# Patient Record
Sex: MALE | Race: AMERICAN INDIAN OR ALASKA NATIVE
[De-identification: names, ages, dates, MRNs, and addresses within clinical notes are randomized per-mention and may not be internally consistent; named-entity substitution may affect disease eponyms.]

---

## 2019-10-15 ENCOUNTER — HOSPITAL ENCOUNTER (INPATIENT)
Dept: HOSPITAL 5 - ED | Age: 54
LOS: 1 days | Discharge: HOME | DRG: 304 | End: 2019-10-16
Attending: INTERNAL MEDICINE | Admitting: INTERNAL MEDICINE
Payer: SELF-PAY

## 2019-10-15 DIAGNOSIS — I25.5: ICD-10-CM

## 2019-10-15 DIAGNOSIS — Z91.19: ICD-10-CM

## 2019-10-15 DIAGNOSIS — I11.0: ICD-10-CM

## 2019-10-15 DIAGNOSIS — F17.200: ICD-10-CM

## 2019-10-15 DIAGNOSIS — Z71.6: ICD-10-CM

## 2019-10-15 DIAGNOSIS — F17.210: ICD-10-CM

## 2019-10-15 DIAGNOSIS — I50.23: ICD-10-CM

## 2019-10-15 DIAGNOSIS — I16.1: Primary | ICD-10-CM

## 2019-10-15 DIAGNOSIS — I25.10: ICD-10-CM

## 2019-10-15 DIAGNOSIS — I69.344: ICD-10-CM

## 2019-10-15 DIAGNOSIS — E78.5: ICD-10-CM

## 2019-10-15 DIAGNOSIS — I25.2: ICD-10-CM

## 2019-10-15 LAB
APTT BLD: 25.2 SEC. (ref 24.2–36.6)
BASOPHILS # (AUTO): 0 K/MM3 (ref 0–0.1)
BASOPHILS NFR BLD AUTO: 0.3 % (ref 0–1.8)
BENZODIAZEPINES SCREEN,URINE: (no result)
BUN SERPL-MCNC: 13 MG/DL (ref 9–20)
BUN/CREAT SERPL: 12 %
CALCIUM SERPL-MCNC: 8.3 MG/DL (ref 8.4–10.2)
EOSINOPHIL # BLD AUTO: 0.2 K/MM3 (ref 0–0.4)
EOSINOPHIL NFR BLD AUTO: 1.2 % (ref 0–4.3)
HCT VFR BLD CALC: 42.3 % (ref 35.5–45.6)
HEMOLYSIS INDEX: 5
HGB BLD-MCNC: 13.9 GM/DL (ref 11.8–15.2)
INR PPP: 1.08 (ref 0.87–1.13)
LYMPHOCYTES # BLD AUTO: 3.8 K/MM3 (ref 1.2–5.4)
LYMPHOCYTES NFR BLD AUTO: 24.8 % (ref 13.4–35)
MCHC RBC AUTO-ENTMCNC: 33 % (ref 32–34)
MCV RBC AUTO: 87 FL (ref 84–94)
METHADONE SCREEN,URINE: (no result)
MONOCYTES # (AUTO): 1.1 K/MM3 (ref 0–0.8)
MONOCYTES % (AUTO): 7.3 % (ref 0–7.3)
OPIATE SCREEN,URINE: (no result)
PLATELET # BLD: 278 K/MM3 (ref 140–440)
RBC # BLD AUTO: 4.85 M/MM3 (ref 3.65–5.03)

## 2019-10-15 PROCEDURE — 82550 ASSAY OF CK (CPK): CPT

## 2019-10-15 PROCEDURE — 80307 DRUG TEST PRSMV CHEM ANLYZR: CPT

## 2019-10-15 PROCEDURE — 99406 BEHAV CHNG SMOKING 3-10 MIN: CPT

## 2019-10-15 PROCEDURE — 94644 CONT INHLJ TX 1ST HOUR: CPT

## 2019-10-15 PROCEDURE — 83880 ASSAY OF NATRIURETIC PEPTIDE: CPT

## 2019-10-15 PROCEDURE — 93005 ELECTROCARDIOGRAM TRACING: CPT

## 2019-10-15 PROCEDURE — 84484 ASSAY OF TROPONIN QUANT: CPT

## 2019-10-15 PROCEDURE — 93306 TTE W/DOPPLER COMPLETE: CPT

## 2019-10-15 PROCEDURE — 4A033R1 MEASUREMENT OF ARTERIAL SATURATION, PERIPHERAL, PERCUTANEOUS APPROACH: ICD-10-PCS | Performed by: INTERNAL MEDICINE

## 2019-10-15 PROCEDURE — 85025 COMPLETE CBC W/AUTO DIFF WBC: CPT

## 2019-10-15 PROCEDURE — 94640 AIRWAY INHALATION TREATMENT: CPT

## 2019-10-15 PROCEDURE — 71275 CT ANGIOGRAPHY CHEST: CPT

## 2019-10-15 PROCEDURE — 85379 FIBRIN DEGRADATION QUANT: CPT

## 2019-10-15 PROCEDURE — 80048 BASIC METABOLIC PNL TOTAL CA: CPT

## 2019-10-15 PROCEDURE — 85007 BL SMEAR W/DIFF WBC COUNT: CPT

## 2019-10-15 PROCEDURE — 82553 CREATINE MB FRACTION: CPT

## 2019-10-15 PROCEDURE — 82803 BLOOD GASES ANY COMBINATION: CPT

## 2019-10-15 PROCEDURE — 93010 ELECTROCARDIOGRAM REPORT: CPT

## 2019-10-15 PROCEDURE — 71045 X-RAY EXAM CHEST 1 VIEW: CPT

## 2019-10-15 PROCEDURE — 94760 N-INVAS EAR/PLS OXIMETRY 1: CPT

## 2019-10-15 PROCEDURE — 85610 PROTHROMBIN TIME: CPT

## 2019-10-15 PROCEDURE — 36415 COLL VENOUS BLD VENIPUNCTURE: CPT

## 2019-10-15 PROCEDURE — 5A09357 ASSISTANCE WITH RESPIRATORY VENTILATION, LESS THAN 24 CONSECUTIVE HOURS, CONTINUOUS POSITIVE AIRWAY PRESSURE: ICD-10-PCS | Performed by: INTERNAL MEDICINE

## 2019-10-15 PROCEDURE — 85730 THROMBOPLASTIN TIME PARTIAL: CPT

## 2019-10-15 RX ADMIN — ARFORMOTEROL TARTRATE SCH MCG: 15 SOLUTION RESPIRATORY (INHALATION) at 20:15

## 2019-10-15 RX ADMIN — METOPROLOL TARTRATE SCH MG: 25 TABLET, FILM COATED ORAL at 21:52

## 2019-10-15 RX ADMIN — BUDESONIDE SCH MG: 0.5 INHALANT RESPIRATORY (INHALATION) at 20:15

## 2019-10-15 RX ADMIN — FUROSEMIDE SCH MG: 10 INJECTION, SOLUTION INTRAVENOUS at 17:05

## 2019-10-15 RX ADMIN — FAMOTIDINE SCH MG: 10 INJECTION, SOLUTION INTRAVENOUS at 12:01

## 2019-10-15 RX ADMIN — LISINOPRIL SCH MG: 5 TABLET ORAL at 12:04

## 2019-10-15 RX ADMIN — Medication SCH ML: at 12:05

## 2019-10-15 RX ADMIN — METOPROLOL TARTRATE SCH MG: 25 TABLET, FILM COATED ORAL at 12:04

## 2019-10-15 RX ADMIN — SPIRONOLACTONE SCH MG: 25 TABLET ORAL at 12:02

## 2019-10-15 RX ADMIN — IPRATROPIUM BROMIDE AND ALBUTEROL SULFATE SCH: .5; 3 SOLUTION RESPIRATORY (INHALATION) at 14:02

## 2019-10-15 RX ADMIN — ENOXAPARIN SODIUM SCH MG: 100 INJECTION SUBCUTANEOUS at 12:01

## 2019-10-15 RX ADMIN — ASPIRIN SCH MG: 325 TABLET, COATED ORAL at 12:02

## 2019-10-15 RX ADMIN — Medication SCH ML: at 21:52

## 2019-10-15 RX ADMIN — FAMOTIDINE SCH MG: 10 INJECTION, SOLUTION INTRAVENOUS at 21:53

## 2019-10-15 RX ADMIN — IPRATROPIUM BROMIDE AND ALBUTEROL SULFATE SCH: .5; 3 SOLUTION RESPIRATORY (INHALATION) at 20:15

## 2019-10-15 NOTE — XRAY REPORT
CHEST 1 VIEW 



INDICATION / CLINICAL INFORMATION:

resp distress.



COMPARISON: 

None available.



FINDINGS:



SUPPORT DEVICES: None.



HEART / MEDIASTINUM: No significant abnormality. 



LUNGS / PLEURA: Interstitial opacity in both perihilar regions. No pneumothorax. 



ADDITIONAL FINDINGS: No significant additional findings.



IMPRESSION:

1. Interstitial disease thought represent pulmonary edema.



Signer Name: Juan F Ivey MD 

Signed: 10/15/2019 2:22 AM

 Workstation Name: [email or web address]

## 2019-10-15 NOTE — CAT SCAN REPORT
CT angio chest 



INDICATION / CLINICAL INFORMATION:

MAIN: SHORTNESS OF BREATH. 100 ML OMNIPAQUE 350.



TECHNIQUE:

Precontrast bolus timing images were obtained followed by postcontrast axial and reformatted images.

3-plane MIP reconstructions were performed at an independent workstation by the technologist.

 All CT scans at this location are performed using CT dose reduction for ALARA by means of automated 
exposure control. 



COMPARISON:

None available.



FINDINGS:

Enhancement of the pulmonary arteries is normal. No evidence of pulmonary embolism.



There is a mild bullous lung disease in the apices.

Slight prominence of interstitial markings diffusely.

Very small right pleural effusion is present.

Dependent atelectasis bilaterally in the lower lobes.



There are no significantly enlarged mediastinal lymph nodes.

No pericardial effusion.



Limited upper abdominal images are negative.



Degenerative changes are seen in the thoracic spine. No acute osseous abnormality.



IMPRESSION:

1. No evidence of pulmonary embolus.

2. Mild diffuse interstitial lung disease. 



Signer Name: Juan F Ivey MD 

Signed: 10/15/2019 4:39 AM

 Workstation Name: [email or web address]

## 2019-10-15 NOTE — CONSULTATION
History of Present Illness


Consult date: 10/15/19


Consult reason: congestive heart failure


History of present illness: 





This is a 54-year old male who suffered an MI in 2014 and underwent coronary 

stenting of the right coronary artery. In 2016, he returned to this hospital 

with acute coronary syndrome. A cardiac cath at this time revealed  of distal

circumflex,  of mid RCA within prior stent, ejection fraction 20%. 

Angioplasty of the  RCA was attempted but unsuccessful, recommend for medical

therapy and risk factor modification, including smoking cessation. Patient has 

been lost to outpatient cardiac follow up and he continues to smoke.





Patient presented with complaints of shortness of breath. Patient admits he ran 

out of medications several weeks ago. Patient denies chest pain, denies 

palpitations and there is no lower extremity edema. Blood pressure of 161/101 on

presentation. Chest x-ray suggestive of acute pulmonary edema. 





Past History


Past Medical History: acute MI, CAD, hypertension, hyperlipidemia, stroke


Past Surgical History: No surgical history, Other (stent placement)


Social history: smoking (1/2 ppd)





Medications and Allergies


                                    Allergies











Allergy/AdvReac Type Severity Reaction Status Date / Time


 


No Known Allergies Allergy   Verified 10/15/19 04:14











Active Meds: 


Active Medications





Acetaminophen (Tylenol)  650 mg PO Q4H PRN


   PRN Reason: Pain MILD(1-3)/Fever >100.5/HA


Albuterol/Ipratropium (Duoneb *Not For Prn Use*)  1 ampul [facility]H TIDRT [facility]


Arformoterol Tartrate (Brovana Nebu)  15 mcg IH Q12HRT [facility]


Aspirin (Ecotrin)  325 mg PO QDAY [facility]


Atorvastatin Calcium (Lipitor)  80 mg PO QHS [facility]


Budesonide (Pulmicort)  0.5 mg IH Q12HRT [facility]


Enoxaparin Sodium (Lovenox)  40 mg SUB-Q QDAY [facility]


Famotidine (Pepcid)  20 mg IV BID [facility]


Methylprednisolone Sodium Succinate (Solu-Medrol)  60 mg IV Q8HR [facility]


   Last Admin: 10/15/19 07:37 Dose:  60 mg


   Documented by: 


Metoprolol Tartrate (Lopressor)  25 mg PO BID [facility]


Ondansetron HCl (Zofran)  4 mg IV Q8H PRN


   PRN Reason: Nausea And Vomiting


Sodium Chloride (Sodium Chloride Flush Syringe 10 Ml)  10 ml IV BID JETT


Sodium Chloride (Sodium Chloride Flush Syringe 10 Ml)  10 ml IV PRN PRN


   PRN Reason: LINE FLUSH











Physical Examination


                                   Vital Signs











Temp Pulse Resp BP Pulse Ox


 


 97.8 F   115 H  32 H  161/101   100 


 


 10/15/19 02:00  10/15/19 02:00  10/15/19 02:00  10/15/19 02:00  10/15/19 02:00











General appearance: no acute distress


HEENT: Positive: PERRL


Neck: Positive: trachea midline


Cardiac: Positive: Reg Rate and Rhythm


Lungs: Positive: Decreased Breath Sounds


Neuro: Positive: Grossly Intact


Extremities: Absent: edema





Results





                                 10/15/19 02:10





                                 10/15/19 02:10


                                 Cardiac Enzymes











  10/15/19 Range/Units





  10:21 


 


CK-MB (CK-2)  7.7 H  (0.0-4.0)  ng/mL








                                   Coagulation











  10/15/19 Range/Units





  02:10 


 


PT  13.7  (12.2-14.9)  Sec.


 


INR  1.08  (0.87-1.13)  


 


APTT  25.2  (24.2-36.6)  Sec.








                                       CBC











  10/15/19 Range/Units





  02:10 


 


WBC  15.3 H  (4.5-11.0)  K/mm3


 


RBC  4.85  (3.65-5.03)  M/mm3


 


Hgb  13.9  (11.8-15.2)  gm/dl


 


Hct  42.3  (35.5-45.6)  %


 


Plt Count  278  (140-440)  K/mm3


 


Lymph #  3.8  (1.2-5.4)  K/mm3


 


Mono #  1.1 H  (0.0-0.8)  K/mm3


 


Eos #  0.2  (0.0-0.4)  K/mm3


 


Baso #  0.0  (0.0-0.1)  K/mm3








                          Comprehensive Metabolic Panel











  10/15/19 Range/Units





  02:10 


 


Sodium  140  (137-145)  mmol/L


 


Potassium  3.8  (3.6-5.0)  mmol/L


 


Chloride  104.1  ()  mmol/L


 


Carbon Dioxide  22  (22-30)  mmol/L


 


BUN  13  (9-20)  mg/dL


 


Creatinine  1.1  (0.8-1.5)  mg/dL


 


Glucose  230 H  ()  mg/dL


 


Calcium  8.3 L  (8.4-10.2)  mg/dL














Assessment and Plan





Acute pulmonary edema


History of MI/CAD


   [facility] 12/2016 findings:


    of distal circ


    of mid RCA within prior stent


   EF 20%


   Unsuccessful PCI of the RCA , recommended for medical therapy and risk 

factor modification.


Ischemic Cardiomyopathy


Hypertension


Nicotine dependence


Noncompliant with medication and outpatient cardiac follow up.





Recommend 


Advised smoking cessation.


Medical therapy for coronary disease and ischemic cardiomyopathy.


Echocardiogram for LVEF reassessment.

## 2019-10-15 NOTE — HISTORY AND PHYSICAL REPORT
<SAINT-ANGELA,HURLINE - Last Filed: 10/15/19 04:30>





History of Present Illness


Date of examination: 10/15/19


Date of admission: 


10/15/19


Chief complaint: 





SOB


History of present illness: 





Patient is a 53-year-old  with PMHx of CAD s/p stent x 2, CVA with left lower 

leg weakness, hypertension who was brought to the ER with by EMS for c/o diffi

culty breathing. According to the patient's SO in room, he woke up in the middle

of the night with severe respiratory distress. Pt states that he was unable to 

catch his breath. On arrival to the ER,  Patient was given albuterol treatment 

with no improvement in his symptoms, he was  also given Solu-Medrol,  0.3 of 

epinephrine IM with little improvement, he was later he was placed in a CPAP 

with good response.  Pt denies prior history of CHF, he reports smoking 1/2 ppd 

denies chest pain, he denies n/v, denies diaphoresis, denies palpitation, pt's 

BP was elevated, he was admitted for further evaluation and treatment.





Past History


Past Medical History: acute MI, CAD, hypertension, hyperlipidemia, stroke


Past Surgical History: No surgical history, Other (stent placement)


Social history: smoking (1/2 ppd)





Medications and Allergies


                                    Allergies











Allergy/AdvReac Type Severity Reaction Status Date / Time


 


No Known Allergies Allergy   Verified 10/15/19 04:14











Active Meds: 


Active Medications





Nitroglycerin/Dextrose (Tridil Drip 50mg/250ml)  50 mg in 250 mls @ 3 mls/hr IV 

TITR JETT; Protocol











Review of Systems


Respiratory: shortness of breath, dyspnea on exertion





Exam





- Constitutional


Vitals: 


                                        











Temp Pulse Resp BP Pulse Ox


 


 97.8 F   104 H  14   116/67   98 


 


 10/15/19 02:00  10/15/19 03:15  10/15/19 03:15  10/15/19 03:15  10/15/19 03:15











General appearance: Present: severe distress





- EENT


Eyes: Present: PERRL, EOM intact


ENT: hearing intact





- Neck


Neck: Present: normal ROM





- Respiratory


Respiratory effort: labored, accessory muscle use


Respiratory: bilateral: diminished





- Cardiovascular


Rhythm: irregularly irregular





- Extremities


Extremities: no ischemia, No edema


Peripheral Pulses: within normal limits





- Abdominal


General gastrointestinal: Present: non-tender, tender, non-distended





- Rectal


Rectal Exam: deferred





- Integumentary


Integumentary: Present: warm, dry





- Musculoskeletal


Musculoskeletal: strength equal bilaterally





- Psychiatric


Psychiatric: cooperative





- Neurologic


Neurologic: moves all extremities





Results





- Labs


CBC & Chem 7: 


                                 10/15/19 02:10





                                 10/15/19 02:10


Labs: 


                             Laboratory Last Values











WBC  15.3 K/mm3 (4.5-11.0)  H  10/15/19  02:10    


 


RBC  4.85 M/mm3 (3.65-5.03)   10/15/19  02:10    


 


Hgb  13.9 gm/dl (11.8-15.2)   10/15/19  02:10    


 


Hct  42.3 % (35.5-45.6)   10/15/19  02:10    


 


MCV  87 fl (84-94)   10/15/19  02:10    


 


MCH  29 pg (28-32)   10/15/19  02:10    


 


MCHC  33 % (32-34)   10/15/19  02:10    


 


RDW  15.2 % (13.2-15.2)   10/15/19  02:10    


 


Plt Count  278 K/mm3 (140-440)   10/15/19  02:10    


 


Lymph % (Auto)  24.8 % (13.4-35.0)   10/15/19  02:10    


 


Mono % (Auto)  7.3 % (0.0-7.3)   10/15/19  02:10    


 


Eos % (Auto)  1.2 % (0.0-4.3)   10/15/19  02:10    


 


Baso % (Auto)  0.3 % (0.0-1.8)   10/15/19  02:10    


 


Lymph #  3.8 K/mm3 (1.2-5.4)   10/15/19  02:10    


 


Mono #  1.1 K/mm3 (0.0-0.8)  H  10/15/19  02:10    


 


Eos #  0.2 K/mm3 (0.0-0.4)   10/15/19  02:10    


 


Baso #  0.0 K/mm3 (0.0-0.1)   10/15/19  02:10    


 


Seg Neutrophils %  66.4 % (40.0-70.0)   10/15/19  02:10    


 


Seg Neutrophils #  10.2 K/mm3 (1.8-7.7)  H  10/15/19  02:10    


 


PT  13.7 Sec. (12.2-14.9)   10/15/19  02:10    


 


INR  1.08  (0.87-1.13)   10/15/19  02:10    


 


APTT  25.2 Sec. (24.2-36.6)   10/15/19  02:10    


 


D-Dimer  340.09 ng/mlDDU (0-234)  H  10/15/19  02:10    


 


POC ABG pH  7.273  (7.35-7.45)  L  10/15/19  02:24    


 


POC ABG pCO2  53.2  (35-45)  H  10/15/19  02:24    


 


POC ABG pO2  202  ()  H  10/15/19  02:24    


 


POC ABG HCO3  24.6  (22-26 mml/L)   10/15/19  02:24    


 


POC ABG Total CO2  26  (23-27mmol/L)   10/15/19  02:24    


 


POC ABG O2 Sat  100   10/15/19  02:24    


 


POC ABG Base Excess  -2  ((-2) - (+3)mmol/L)   10/15/19  02:24    


 


FiO2  60 %  10/15/19  02:24    


 


Sodium  140 mmol/L (137-145)   10/15/19  02:10    


 


Potassium  3.8 mmol/L (3.6-5.0)   10/15/19  02:10    


 


Chloride  104.1 mmol/L ()   10/15/19  02:10    


 


Carbon Dioxide  22 mmol/L (22-30)   10/15/19  02:10    


 


Anion Gap  18 mmol/L  10/15/19  02:10    


 


BUN  13 mg/dL (9-20)   10/15/19  02:10    


 


Creatinine  1.1 mg/dL (0.8-1.5)   10/15/19  02:10    


 


Estimated GFR  > 60 ml/min  10/15/19  02:10    


 


BUN/Creatinine Ratio  12 %  10/15/19  02:10    


 


Glucose  230 mg/dL ()  H  10/15/19  02:10    


 


Calcium  8.3 mg/dL (8.4-10.2)  L  10/15/19  02:10    


 


Troponin T  < 0.010 ng/mL (0.00-0.029)   10/15/19  02:10    


 


NT-Pro-B Natriuret Pep  393.4 pg/mL (0-900)   10/15/19  02:10    














Assessment and Plan


Assessment and plan: 





1. New onset CHF


2. H/o CAD s/p stent x 2


3. Accelerated HTN


4. H/o Stroke with left sided weakness


5. Hyperglycemia (? new onset)


6. Tobacco used disorder





Plan:


Admitted to med/tele for CHF/Bipap management


Continue CE q6hr x 2 more


Cardiac diet


Consult cardiology for eval


Start ASA, Statin, ACE, Beta blocker


Possible echocardiogram in am if ok with cardiology


Resume home meds


Plan of care d/w pt voiced understanding


Advance Directives: Yes


VTE prophylaxis?: Chemical


Plan of care discussed with patient/family: Yes





<ROSA HANKINS - Last Filed: 10/15/19 06:31>





History of Present Illness


Date of admission: 


10/15/19 05:10








Medications and Allergies


Active Meds: 


Active Medications





Acetaminophen (Tylenol)  650 mg PO Q4H PRN


   PRN Reason: Pain MILD(1-3)/Fever >100.5/HA


Albuterol/Ipratropium (Duoneb *Not For Prn Use*)  1 ampul IH Q6HRT [facility]


Aspirin (Ecotrin)  325 mg PO QDAY JETT


Atorvastatin Calcium (Lipitor)  80 mg PO QHS JETT


Enoxaparin Sodium (Lovenox)  40 mg SUB-Q QDAY [facility]


Famotidine (Pepcid)  20 mg IV BID [facility]


Methylprednisolone Sodium Succinate (Solu-Medrol)  60 mg IV Q8HR [facility]


Metoprolol Tartrate (Lopressor)  25 mg PO BID [facility]


Ondansetron HCl (Zofran)  4 mg IV Q8H PRN


   PRN Reason: Nausea And Vomiting


Sodium Chloride (Sodium Chloride Flush Syringe 10 Ml)  10 ml IV BID JETT


Sodium Chloride (Sodium Chloride Flush Syringe 10 Ml)  10 ml IV PRN PRN


   PRN Reason: LINE FLUSH











Exam





- Constitutional


Vitals: 


                                        











Temp Pulse Resp BP Pulse Ox


 


 97.8 F   87   19   132/77   95 


 


 10/15/19 02:00  10/15/19 05:16  10/15/19 05:16  10/15/19 05:16  10/15/19 05:16














Results





- Labs


CBC & Chem 7: 


                                 10/15/19 02:10





                                 10/15/19 02:10


Labs: 


                             Laboratory Last Values











WBC  15.3 K/mm3 (4.5-11.0)  H  10/15/19  02:10    


 


RBC  4.85 M/mm3 (3.65-5.03)   10/15/19  02:10    


 


Hgb  13.9 gm/dl (11.8-15.2)   10/15/19  02:10    


 


Hct  42.3 % (35.5-45.6)   10/15/19  02:10    


 


MCV  87 fl (84-94)   10/15/19  02:10    


 


MCH  29 pg (28-32)   10/15/19  02:10    


 


MCHC  33 % (32-34)   10/15/19  02:10    


 


RDW  15.2 % (13.2-15.2)   10/15/19  02:10    


 


Plt Count  278 K/mm3 (140-440)   10/15/19  02:10    


 


Lymph % (Auto)  24.8 % (13.4-35.0)   10/15/19  02:10    


 


Mono % (Auto)  7.3 % (0.0-7.3)   10/15/19  02:10    


 


Eos % (Auto)  1.2 % (0.0-4.3)   10/15/19  02:10    


 


Baso % (Auto)  0.3 % (0.0-1.8)   10/15/19  02:10    


 


Lymph #  3.8 K/mm3 (1.2-5.4)   10/15/19  02:10    


 


Mono #  1.1 K/mm3 (0.0-0.8)  H  10/15/19  02:10    


 


Eos #  0.2 K/mm3 (0.0-0.4)   10/15/19  02:10    


 


Baso #  0.0 K/mm3 (0.0-0.1)   10/15/19  02:10    


 


Seg Neutrophils %  66.4 % (40.0-70.0)   10/15/19  02:10    


 


Seg Neutrophils #  10.2 K/mm3 (1.8-7.7)  H  10/15/19  02:10    


 


PT  13.7 Sec. (12.2-14.9)   10/15/19  02:10    


 


INR  1.08  (0.87-1.13)   10/15/19  02:10    


 


APTT  25.2 Sec. (24.2-36.6)   10/15/19  02:10    


 


D-Dimer  340.09 ng/mlDDU (0-234)  H  10/15/19  02:10    


 


POC ABG pH  7.273  (7.35-7.45)  L  10/15/19  02:24    


 


POC ABG pCO2  53.2  (35-45)  H  10/15/19  02:24    


 


POC ABG pO2  202  ()  H  10/15/19  02:24    


 


POC ABG HCO3  24.6  (22-26 mml/L)   10/15/19  02:24    


 


POC ABG Total CO2  26  (23-27mmol/L)   10/15/19  02:24    


 


POC ABG O2 Sat  100   10/15/19  02:24    


 


POC ABG Base Excess  -2  ((-2) - (+3)mmol/L)   10/15/19  02:24    


 


FiO2  60 %  10/15/19  02:24    


 


Sodium  140 mmol/L (137-145)   10/15/19  02:10    


 


Potassium  3.8 mmol/L (3.6-5.0)   10/15/19  02:10    


 


Chloride  104.1 mmol/L ()   10/15/19  02:10    


 


Carbon Dioxide  22 mmol/L (22-30)   10/15/19  02:10    


 


Anion Gap  18 mmol/L  10/15/19  02:10    


 


BUN  13 mg/dL (9-20)   10/15/19  02:10    


 


Creatinine  1.1 mg/dL (0.8-1.5)   10/15/19  02:10    


 


Estimated GFR  > 60 ml/min  10/15/19  02:10    


 


BUN/Creatinine Ratio  12 %  10/15/19  02:10    


 


Glucose  230 mg/dL ()  H  10/15/19  02:10    


 


Calcium  8.3 mg/dL (8.4-10.2)  L  10/15/19  02:10    


 


Troponin T  < 0.010 ng/mL (0.00-0.029)   10/15/19  02:10    


 


NT-Pro-B Natriuret Pep  393.4 pg/mL (0-900)   10/15/19  02:10    














Assessment and Plan


Assessment and plan: 


54-year-old woman with a history of hypertension, coronary artery disease, 

stroke as emergency room with complaints of shortness of breath that woke him 

from sleep.  He was in her*distress in the emergency room, started on BiPAP 

given IV Lasix.  He has been out of his antihypertensive 2 weeks.  No 

significant for vascular congestion, elevated d-dimer, ordered a CT chest, rule 

out PE.  Also start steroids, nebulizer treatment and for possible COPD.  Check 

cardiac enzymes, echo, consult cardiology.  Agree with Lasix

## 2019-10-15 NOTE — EMERGENCY DEPARTMENT REPORT
ED General Adult HPI





- General


Chief complaint: Dyspnea/Respdistress


Stated complaint: EVERT[place]


Time Seen by Provider: 10/15/19 02:01





- History of Present Illness


Initial comments: 





Patient is a 53-year-old  male with a past history of CVA with 

no residual deficit and hypertension who is presenting in acute respiratory 

distress.  Paramedics state that family called because he woke up from sleep and

could not breathe.  When they arrived on scene patient had an increased 

respiratory rate was tripoding and was not moving air very well.  Patient would 

not tolerate CPAP at the time.  Patient was given albuterol treatment which did 

seem to help the patient's symptoms slightly.  Is also given Solu-Medrol and 

magnesium.  Patient also was given 0.3 of epinephrine IM.  On arrival patient's 

O2 sat was in the hospice 60s and a blood pressure was 180 systolic.  Patient 

was denying chest pain but was unable to give any additional history.





ED Review of Systems


ROS: 


Stated complaint: EVERT[place]


Other details as noted in HPI





Comment: Unobtainable due to pts medical conditions





ED Physical Exam





- General


General appearance: alert, lethargic, in distress, other (patient diaphoretic)





- Head


Head exam: Present: atraumatic, normocephalic





- Eye


Eye exam: Present: normal appearance, PERRL, EOMI





- ENT


ENT exam: Present: mucous membranes moist





- Neck


Neck exam: Present: normal inspection





- Respiratory


Respiratory exam: Present: respiratory distress, decreased breath sounds





- Cardiovascular


Cardiovascular Exam: Present: tachycardia





- GI/Abdominal


GI/Abdominal exam: Present: soft.  Absent: distended, tenderness, guarding, r

ebound, rigid





- Neurological Exam


Neurological exam: Present: alert, oriented X3





- Psychiatric


Psychiatric exam: Present: normal affect, normal mood





- Skin


Skin exam: Present: warm, intact, normal color, diaphoretic.  Absent: rash





ED Course





                                   Vital Signs











  10/15/19





  02:00


 


Pulse Rate 115 H


 


Pulse Rate [ 115 H





Anterior 





Bilateral 





Throughout] 


 


Respiratory 44 H





Rate 


 


Respiratory 32 H





Rate [Anterior 





Bilateral 





Throughout] 


 


O2 Sat by Pulse 100





Oximetry 














ED Medical Decision Making





- Lab Data


Result diagrams: 


                                 10/15/19 02:10





                                 10/15/19 02:10








                                   Lab Results











  10/15/19 10/15/19 10/15/19 Range/Units





  02:10 02:10 02:10 


 


WBC  15.3 H    (4.5-11.0)  K/mm3


 


RBC  4.85    (3.65-5.03)  M/mm3


 


Hgb  13.9    (11.8-15.2)  gm/dl


 


Hct  42.3    (35.5-45.6)  %


 


MCV  87    (84-94)  fl


 


MCH  29    (28-32)  pg


 


MCHC  33    (32-34)  %


 


RDW  15.2    (13.2-15.2)  %


 


Plt Count  278    (140-440)  K/mm3


 


Lymph % (Auto)  24.8    (13.4-35.0)  %


 


Mono % (Auto)  7.3    (0.0-7.3)  %


 


Eos % (Auto)  1.2    (0.0-4.3)  %


 


Baso % (Auto)  0.3    (0.0-1.8)  %


 


Lymph #  3.8    (1.2-5.4)  K/mm3


 


Mono #  1.1 H    (0.0-0.8)  K/mm3


 


Eos #  0.2    (0.0-0.4)  K/mm3


 


Baso #  0.0    (0.0-0.1)  K/mm3


 


Seg Neutrophils %  66.4    (40.0-70.0)  %


 


Seg Neutrophils #  10.2 H    (1.8-7.7)  K/mm3


 


PT   13.7   (12.2-14.9)  Sec.


 


INR   1.08   (0.87-1.13)  


 


APTT   25.2   (24.2-36.6)  Sec.


 


D-Dimer   340.09 H   (0-234)  ng/mlDDU


 


POC ABG pH     (7.35-7.45)  


 


POC ABG pCO2     (35-45)  


 


POC ABG pO2     ()  


 


POC ABG HCO3     (22-26 mml/L)  


 


POC ABG Total CO2     (23-27mmol/L)  


 


POC ABG O2 Sat     


 


POC ABG Base Excess     ((-2) - (+3)mmol/L)  


 


FiO2     %


 


Sodium    140  (137-145)  mmol/L


 


Potassium    3.8  (3.6-5.0)  mmol/L


 


Chloride    104.1  ()  mmol/L


 


Carbon Dioxide    22  (22-30)  mmol/L


 


Anion Gap    18  mmol/L


 


BUN    13  (9-20)  mg/dL


 


Creatinine    1.1  (0.8-1.5)  mg/dL


 


Estimated GFR    > 60  ml/min


 


BUN/Creatinine Ratio    12  %


 


Glucose    230 H  ()  mg/dL


 


Calcium    8.3 L  (8.4-10.2)  mg/dL


 


Troponin T    < 0.010  (0.00-0.029)  ng/mL


 


NT-Pro-B Natriuret Pep    393.4  (0-900)  pg/mL














  10/15/19 Range/Units





  02:24 


 


WBC   (4.5-11.0)  K/mm3


 


RBC   (3.65-5.03)  M/mm3


 


Hgb   (11.8-15.2)  gm/dl


 


Hct   (35.5-45.6)  %


 


MCV   (84-94)  fl


 


MCH   (28-32)  pg


 


MCHC   (32-34)  %


 


RDW   (13.2-15.2)  %


 


Plt Count   (140-440)  K/mm3


 


Lymph % (Auto)   (13.4-35.0)  %


 


Mono % (Auto)   (0.0-7.3)  %


 


Eos % (Auto)   (0.0-4.3)  %


 


Baso % (Auto)   (0.0-1.8)  %


 


Lymph #   (1.2-5.4)  K/mm3


 


Mono #   (0.0-0.8)  K/mm3


 


Eos #   (0.0-0.4)  K/mm3


 


Baso #   (0.0-0.1)  K/mm3


 


Seg Neutrophils %   (40.0-70.0)  %


 


Seg Neutrophils #   (1.8-7.7)  K/mm3


 


PT   (12.2-14.9)  Sec.


 


INR   (0.87-1.13)  


 


APTT   (24.2-36.6)  Sec.


 


D-Dimer   (0-234)  ng/mlDDU


 


POC ABG pH  7.273 L  (7.35-7.45)  


 


POC ABG pCO2  53.2 H  (35-45)  


 


POC ABG pO2  202 H  ()  


 


POC ABG HCO3  24.6  (22-26 mml/L)  


 


POC ABG Total CO2  26  (23-27mmol/L)  


 


POC ABG O2 Sat  100  


 


POC ABG Base Excess  -2  ((-2) - (+3)mmol/L)  


 


FiO2  60  %


 


Sodium   (137-145)  mmol/L


 


Potassium   (3.6-5.0)  mmol/L


 


Chloride   ()  mmol/L


 


Carbon Dioxide   (22-30)  mmol/L


 


Anion Gap   mmol/L


 


BUN   (9-20)  mg/dL


 


Creatinine   (0.8-1.5)  mg/dL


 


Estimated GFR   ml/min


 


BUN/Creatinine Ratio   %


 


Glucose   ()  mg/dL


 


Calcium   (8.4-10.2)  mg/dL


 


Troponin T   (0.00-0.029)  ng/mL


 


NT-Pro-B Natriuret Pep   (0-900)  pg/mL














- EKG Data


-: EKG Interpreted by Me


EKG shows normal: sinus rhythm, axis, intervals, QRS complexes, ST-T waves


Rate: tachycardia





- Radiology Data





CHEST 1 VIEW INDICATION / CLINICAL INFORMATION: resp distress. COMPARISON: None 

available. FINDINGS: SUPPORT DEVICES: None. HEART / MEDIASTINUM: No significant 

abnormality. LUNGS / PLEURA: Interstitial opacity in both perihilar regions. No 

pneumothorax. ADDITIONAL FINDINGS: No significant additional findings. 

IMPRESSION: 1. Interstitial disease thought represent pulmonary edema. Signer 

Name: Juan F Ivey MD Signed: 10/15/2019 2:22 AM Workstation Name: [id number] 

Transcribed By: GA Dictated By: Juan F Ivey MD Electronically Authenticated 

By: Juan F Ivey MD Signed Date/Time: 10/15/19 0222 





- Medical Decision Making





Was placed on BiPAP on arrival.  Patient mental status improved dramatically.  

Patient was started on nitroglycerin drip.  A blood pressure improved to the 

130s systolic and heart rate improved as well.  Patient mental status returned 

back to baseline at the time of admission.  Patient is tolerating BiPAP well.  

Patient did reveal that he has ran out of blood pressure medications patient lik

ely with flash pulmonary edema secondary to hypertensive emergency.  Patient be 

admitted to the hospitalist service at this time.


Critical Care Time: Yes (40)


Critical care attestation.: 


If time is entered above; I have spent that time in minutes in the direct care 

of this critically ill patient, excluding procedure time.








ED Disposition


Clinical Impression: 


 Hypertensive emergency





Pulmonary edema


Qualifiers:


 Chronicity: acute Qualified Code(s): J81.0 - Acute pulmonary edema





Disposition: DC-09 OP ADMIT IP TO THIS HOSP


Is pt being admited?: Yes


Does the pt Need Aspirin: No


Condition: Stable


Time of Disposition: 02:57

## 2019-10-16 VITALS — DIASTOLIC BLOOD PRESSURE: 76 MMHG | SYSTOLIC BLOOD PRESSURE: 144 MMHG

## 2019-10-16 LAB
BAND NEUTROPHILS # (MANUAL): 0 K/MM3
BUN SERPL-MCNC: 25 MG/DL (ref 9–20)
BUN/CREAT SERPL: 21 %
CALCIUM SERPL-MCNC: 9.3 MG/DL (ref 8.4–10.2)
HCT VFR BLD CALC: 43.5 % (ref 35.5–45.6)
HEMOLYSIS INDEX: 8
HGB BLD-MCNC: 14.2 GM/DL (ref 11.8–15.2)
MCHC RBC AUTO-ENTMCNC: 33 % (ref 32–34)
MCV RBC AUTO: 86 FL (ref 84–94)
MYELOCYTES # (MANUAL): 0 K/MM3
PLATELET # BLD: 276 K/MM3 (ref 140–440)
PROMYELOCYTES # (MANUAL): 0 K/MM3
RBC # BLD AUTO: 5.04 M/MM3 (ref 3.65–5.03)
TOTAL CELLS COUNTED BLD: 100

## 2019-10-16 RX ADMIN — SPIRONOLACTONE SCH MG: 25 TABLET ORAL at 10:58

## 2019-10-16 RX ADMIN — IPRATROPIUM BROMIDE AND ALBUTEROL SULFATE SCH AMPUL: .5; 3 SOLUTION RESPIRATORY (INHALATION) at 08:09

## 2019-10-16 RX ADMIN — METOPROLOL TARTRATE SCH MG: 25 TABLET, FILM COATED ORAL at 10:58

## 2019-10-16 RX ADMIN — Medication SCH ML: at 11:00

## 2019-10-16 RX ADMIN — BUDESONIDE SCH MG: 0.5 INHALANT RESPIRATORY (INHALATION) at 08:09

## 2019-10-16 RX ADMIN — LISINOPRIL SCH MG: 5 TABLET ORAL at 10:59

## 2019-10-16 RX ADMIN — FUROSEMIDE SCH MG: 10 INJECTION, SOLUTION INTRAVENOUS at 05:01

## 2019-10-16 RX ADMIN — FAMOTIDINE SCH MG: 10 INJECTION, SOLUTION INTRAVENOUS at 10:59

## 2019-10-16 RX ADMIN — IPRATROPIUM BROMIDE AND ALBUTEROL SULFATE SCH AMPUL: .5; 3 SOLUTION RESPIRATORY (INHALATION) at 15:11

## 2019-10-16 RX ADMIN — ENOXAPARIN SODIUM SCH MG: 100 INJECTION SUBCUTANEOUS at 10:59

## 2019-10-16 RX ADMIN — ASPIRIN SCH MG: 325 TABLET, COATED ORAL at 10:59

## 2019-10-16 RX ADMIN — ARFORMOTEROL TARTRATE SCH MCG: 15 SOLUTION RESPIRATORY (INHALATION) at 08:09

## 2019-10-16 NOTE — PROGRESS NOTE
<BASIM SAUCEDA - Last Filed: 10/16/19 13:01>





Assessment and Plan





Acute pulmonary edema


History of MI/CAD


   [facility] 12/2016 findings:


    of distal circ


    of mid RCA within prior stent


   EF 20%


   Unsuccessful PCI of the RCA , recommended for medical therapy and risk 

factor modification.


Ischemic Cardiomyopathy


Hypertension


Nicotine dependence


Noncompliant with medication and outpatient cardiac follow up.





Recommend 


Advised smoking cessation.


Continue medical therapy for coronary disease and ischemic cardiomyopathy.


We will discontinue IV diuretics and instead use oral Lasix.





Subjective


Date of service: 10/16/19


Interval history: 





IV milrinone never started by nursing staf on 10/15. 


Patient reports his breathing is better. He denies chest pain.








Objective


                                   Vital Signs











  Temp Pulse Pulse Resp Resp BP Pulse Ox


 


 10/16/19 10:59   77     144/76 


 


 10/16/19 10:58   77     144/76 


 


 10/16/19 10:00        94


 


 10/16/19 08:15  98.6 F  77  75  18  18  144/76  94


 


 10/16/19 06:00   83     


 


 10/16/19 05:04  97.7 F  83   24   147/82  96


 


 10/16/19 00:47  98.0 F  78   16   113/74  96


 


 10/15/19 22:00   78     


 


 10/15/19 21:52   82     136/71 


 


 10/15/19 21:02  98.5 F  81   24   136/71  94


 


 10/15/19 20:18        98


 


 10/15/19 20:00    88   20  


 


 10/15/19 16:42  98.6 F  108 H   18   141/106  92














- Physical Examination


General: No Apparent Distress


HEENT: Positive: PERRL


Neck: Positive: trachea midline


Cardiac: Positive: Reg Rate and Rhythm


Lungs: Positive: Normal Breath Sounds


Neuro: Positive: Grossly Intact


Extremities: Absent: edema





- Labs and Meds


                                 Cardiac Enzymes











  10/15/19 Range/Units





  12:46 


 


CK-MB (CK-2)  8.8 H  (0.0-4.0)  ng/mL








                                       CBC











  10/16/19 Range/Units





  06:34 


 


WBC  21.3 H  (4.5-11.0)  K/mm3


 


RBC  5.04 H  (3.65-5.03)  M/mm3


 


Hgb  14.2  (11.8-15.2)  gm/dl


 


Hct  43.5  (35.5-45.6)  %


 


Plt Count  276  (140-440)  K/mm3








                          Comprehensive Metabolic Panel











  10/16/19 Range/Units





  06:34 


 


Sodium  136 L  (137-145)  mmol/L


 


Potassium  4.5  (3.6-5.0)  mmol/L


 


Chloride  98.7  ()  mmol/L


 


Carbon Dioxide  19 L  (22-30)  mmol/L


 


BUN  25 H  (9-20)  mg/dL


 


Creatinine  1.2  (0.8-1.5)  mg/dL


 


Glucose  171 H  ()  mg/dL


 


Calcium  9.3  (8.4-10.2)  mg/dL














<JOELLEN HUFFMAN - Last Filed: 10/16/19 17:46>





Assessment and Plan


I have seen and evaluated the patient and agree with the assessment and plan. 

The patient presents with a history of CAD and ischemic cardiomyopathy with EF 

20%. recommend continue goal directed medical therapy. Change IV diuretics to 

oral diuretics. 








Objective


                                   Vital Signs











  Temp Pulse Pulse Resp Resp BP Pulse Ox


 


 10/16/19 14:00   76  89   17  


 


 10/16/19 10:59   77     144/76 


 


 10/16/19 10:58   77     144/76 


 


 10/16/19 10:00        94


 


 10/16/19 08:15  98.6 F  77  75  18  18  144/76  94


 


 10/16/19 06:00   83     


 


 10/16/19 05:04  97.7 F  83   24   147/82  96


 


 10/16/19 00:47  98.0 F  78   16   113/74  96


 


 10/15/19 22:00   78     


 


 10/15/19 21:52   82     136/71 


 


 10/15/19 21:02  98.5 F  81   24   136/71  94


 


 10/15/19 20:18        98


 


 10/15/19 20:00    88   20  














- Labs and Meds


                                       CBC











  10/16/19 Range/Units





  06:34 


 


WBC  21.3 H  (4.5-11.0)  K/mm3


 


RBC  5.04 H  (3.65-5.03)  M/mm3


 


Hgb  14.2  (11.8-15.2)  gm/dl


 


Hct  43.5  (35.5-45.6)  %


 


Plt Count  276  (140-440)  K/mm3








                          Comprehensive Metabolic Panel











  10/16/19 Range/Units





  06:34 


 


Sodium  136 L  (137-145)  mmol/L


 


Potassium  4.5  (3.6-5.0)  mmol/L


 


Chloride  98.7  ()  mmol/L


 


Carbon Dioxide  19 L  (22-30)  mmol/L


 


BUN  25 H  (9-20)  mg/dL


 


Creatinine  1.2  (0.8-1.5)  mg/dL


 


Glucose  171 H  ()  mg/dL


 


Calcium  9.3  (8.4-10.2)  mg/dL

## 2019-10-16 NOTE — DISCHARGE SUMMARY
Providers





- Providers


Date of Admission: 


10/15/19 05:10





Attending physician: 


ALEJANDRO RAMOS MD





                                        





10/15/19 03:50


Consult to Physician [CONS] Routine 


   Comment: 


   Consulting Provider: ALE TRACEY


   Physician Instructions: 


   Reason For Exam: CHF





10/15/19 03:55


Consult to Physician [CONS] Routine 


   Comment: 


   Consulting Provider: ALE TRACEY


   Physician Instructions: 


   Reason For Exam: pul edema











Primary care physician: 


PRIMARY CARE MD








Hospitalization


Condition: Stable


Hospital course: 





 54-year-old man with history of CAD who presents to the hospital with shortness

 of breath, Patient is poorly compliant with low-sodium diet and medications.  

Has not follow-up with cardiology despite having several appointments, he was 

treated for CHF.  He received IV milrinone, diuretics , afterload agent and oral

 antiplatelets.





Tobacco abuse/dependence


Smoking cessation counseling performed for 10 minutes, nicotine patches when 

necessary





Preventative health counseling performed for 17 minutes





Diagnosis


Acute on chronic systolic heart failure, EF 20%


Ischemic cardiomyopathy


Hypertension


Nicotine dependence


Nonadherence to medication and clinic follow-up





Disposition: DC-01 TO HOME OR SELFCARE


Time spent for discharge: 33 mins





Core Measure Documentation





- Palliative Care


Palliative Care/ Comfort Measures: Not Applicable





- Core Measures


Any of the following diagnoses?: heart failure





- Heart Failure Discharge Requirements


ACE/ARB for LVSD if EF <40%: Yes


Beta blocker at discharge: Yes





Exam





- Constitutional


Vitals: 


                                        











Temp Pulse Resp BP Pulse Ox


 


 98.6 F   77   18   144/76   94 


 


 10/16/19 08:15  10/16/19 10:59  10/16/19 08:15  10/16/19 10:59  10/16/19 10:00











General appearance: Present: no acute distress, well-nourished





- EENT


Eyes: Present: PERRL


ENT: hearing intact, clear oral mucosa





- Neck


Neck: Present: supple, normal ROM





- Respiratory


Respiratory effort: normal


Respiratory: bilateral: CTA





- Cardiovascular


Heart Sounds: Present: S1 & S2.  Absent: rub, click





- Extremities


Extremities: pulses symmetrical, No edema


Peripheral Pulses: within normal limits





- Abdominal


General gastrointestinal: Present: soft, non-tender, non-distended, normal bowel

 sounds


Male genitourinary: Present: normal





- Integumentary


Integumentary: Present: clear, warm, dry





- Musculoskeletal


Musculoskeletal: gait normal, strength equal bilaterally





- Psychiatric


Psychiatric: appropriate mood/affect, intact judgment & insight





- Neurologic


Neurologic: CNII-XII intact, moves all extremities





Plan


Follow up with: 


PRIMARY CARE,MD [Primary Care Provider] - 7 Days


Prescriptions: 


Spironolactone [Aldactone] 25 mg PO QDAY #30 tablet


Rosuvastatin Calcium [Crestor] 40 mg PO DAILY #30 tablet


Aspirin EC [Halfprin EC] 81 mg PO QDAY #30 tablet.


Furosemide [Lasix TAB] 80 mg PO 0600,1800 #120 tablet


Metoprolol [Lopressor TAB] 25 mg PO BID #60 tablet


Lisinopril [Zestril TAB] 5 mg PO QDAY #30 tablet

## 2021-01-27 ENCOUNTER — HOSPITAL ENCOUNTER (EMERGENCY)
Dept: HOSPITAL 5 - ED | Age: 56
Discharge: HOME | End: 2021-01-27
Payer: SELF-PAY

## 2021-01-27 VITALS — DIASTOLIC BLOOD PRESSURE: 77 MMHG | SYSTOLIC BLOOD PRESSURE: 133 MMHG

## 2021-01-27 DIAGNOSIS — I11.0: ICD-10-CM

## 2021-01-27 DIAGNOSIS — I50.9: ICD-10-CM

## 2021-01-27 DIAGNOSIS — R06.00: Primary | ICD-10-CM

## 2021-01-27 DIAGNOSIS — Z79.899: ICD-10-CM

## 2021-01-27 DIAGNOSIS — R10.9: ICD-10-CM

## 2021-01-27 DIAGNOSIS — J45.909: ICD-10-CM

## 2021-01-27 DIAGNOSIS — F17.200: ICD-10-CM

## 2021-01-27 DIAGNOSIS — Z79.82: ICD-10-CM

## 2021-01-27 LAB
ALBUMIN SERPL-MCNC: 4.4 G/DL (ref 3.9–5)
ALT SERPL-CCNC: 47 UNITS/L (ref 7–56)
BASOPHILS # (AUTO): 0 K/MM3 (ref 0–0.1)
BASOPHILS NFR BLD AUTO: 0.3 % (ref 0–1.8)
BILIRUB UR QL STRIP: (no result)
BLOOD UR QL VISUAL: (no result)
BUN SERPL-MCNC: 17 MG/DL (ref 9–20)
BUN/CREAT SERPL: 14 %
CALCIUM SERPL-MCNC: 9.2 MG/DL (ref 8.4–10.2)
EOSINOPHIL # BLD AUTO: 0.2 K/MM3 (ref 0–0.4)
EOSINOPHIL NFR BLD AUTO: 1.5 % (ref 0–4.3)
HCT VFR BLD CALC: 46.6 % (ref 35.5–45.6)
HEMOLYSIS INDEX: 15
HGB BLD-MCNC: 15.5 GM/DL (ref 11.8–15.2)
LYMPHOCYTES # BLD AUTO: 2.6 K/MM3 (ref 1.2–5.4)
LYMPHOCYTES NFR BLD AUTO: 18.6 % (ref 13.4–35)
MCHC RBC AUTO-ENTMCNC: 33 % (ref 32–34)
MCV RBC AUTO: 89 FL (ref 84–94)
MONOCYTES # (AUTO): 1 K/MM3 (ref 0–0.8)
MONOCYTES % (AUTO): 7.4 % (ref 0–7.3)
MUCOUS THREADS #/AREA URNS HPF: (no result) /HPF
PH UR STRIP: 5 [PH] (ref 5–7)
PLATELET # BLD: 240 K/MM3 (ref 140–440)
RBC # BLD AUTO: 5.25 M/MM3 (ref 3.65–5.03)
RBC #/AREA URNS HPF: 5 /HPF (ref 0–6)
UROBILINOGEN UR-MCNC: < 2 MG/DL (ref ?–2)
WBC #/AREA URNS HPF: 2 /HPF (ref 0–6)

## 2021-01-27 PROCEDURE — 84484 ASSAY OF TROPONIN QUANT: CPT

## 2021-01-27 PROCEDURE — 80053 COMPREHEN METABOLIC PANEL: CPT

## 2021-01-27 PROCEDURE — 36415 COLL VENOUS BLD VENIPUNCTURE: CPT

## 2021-01-27 PROCEDURE — 85025 COMPLETE CBC W/AUTO DIFF WBC: CPT

## 2021-01-27 PROCEDURE — 83880 ASSAY OF NATRIURETIC PEPTIDE: CPT

## 2021-01-27 PROCEDURE — 81001 URINALYSIS AUTO W/SCOPE: CPT

## 2021-01-27 PROCEDURE — 74177 CT ABD & PELVIS W/CONTRAST: CPT

## 2021-01-27 PROCEDURE — 71046 X-RAY EXAM CHEST 2 VIEWS: CPT

## 2021-01-27 PROCEDURE — 99284 EMERGENCY DEPT VISIT MOD MDM: CPT

## 2021-01-27 PROCEDURE — 93005 ELECTROCARDIOGRAM TRACING: CPT

## 2021-01-27 NOTE — XRAY REPORT
CHEST 2 VIEWS 



INDICATION / CLINICAL INFORMATION: Shortness of breath for 2 hours.



COMPARISON: 10/15/2019



FINDINGS:



SUPPORT DEVICES: None.

HEART / MEDIASTINUM: No significant abnormality. 

LUNGS / PLEURA: There is mild perihilar interstitial change. No pneumothorax. 



ADDITIONAL FINDINGS: No significant additional findings.



IMPRESSION:

1. Mild perihilar interstitial change likely represents mild edema.



Signer Name: Akshat Connor MD 

Signed: 1/27/2021 5:34 AM

Workstation Name: [id number]

## 2021-01-27 NOTE — EMERGENCY DEPARTMENT REPORT
ED Shortness of Breath HPI





- General


Chief Complaint: Dyspnea/Respdistress


Stated Complaint: SOB


Time Seen by Provider: 01/27/21 06:12


Source: patient


Mode of arrival: Ambulatory


Limitations: No Limitations





- History of Present Illness


Initial Comments: 





55-year-old male, history of hypertension, CHF, presents to ED with shortness of

breath.  Patient states he was getting out of the tub at approximately 3 AM, 

when he began to have some abdominal pain and bloating which resulted in 

difficulty breathing.  Patient denies any chest pain, nausea or vomiting, 

orthopnea, lower extremity pain or swelling.  He denies any cough or fever.  

Patient states his breathing is much better at this time.


MD Complaint: shortness of breath


-: hour(s) (2)


Severity: moderate


Consistency: intermittent, now resolved


Worsens With: nothing


Known History Of: congestive heart failure


Treatments Prior to Arrival: none





- Related Data


Home Oxygen Therapy: No


                                  Previous Rx's











 Medication  Instructions  Recorded  Last Taken  Type


 


Aspirin EC [Halfprin EC] 81 mg PO QDAY #30 tablet. 10/16/19 Unknown Rx


 


Furosemide [Lasix TAB] 80 mg PO 0600,1800 #120 tablet 10/16/19 Unknown Rx


 


Metoprolol [Lopressor TAB] 25 mg PO BID #60 tablet 10/16/19 Unknown Rx


 


Rosuvastatin Calcium [Crestor] 40 mg PO DAILY #30 tablet 10/16/19 Unknown Rx


 


Spironolactone [Aldactone] 25 mg PO QDAY #30 tablet 10/16/19 Unknown Rx


 


lisinopriL [Zestril TAB] 5 mg PO QDAY #30 tablet 10/16/19 Unknown Rx











                                    Allergies











Allergy/AdvReac Type Severity Reaction Status Date / Time


 


No Known Allergies Allergy   Verified 10/15/19 04:14














ED Review of Systems


ROS: 


Stated complaint: SOB


Other details as noted in HPI





Comment: All other systems reviewed and negative


Constitutional: denies: chills, fever


Respiratory: shortness of breath.  denies: cough, orthopnea


Cardiovascular: denies: chest pain


Gastrointestinal: abdominal pain.  denies: nausea, vomiting


Musculoskeletal: other (Denies leg pain or swelling)





ED Past Medical Hx





- Past Medical History


Previous Medical History?: Yes


Hx Hypertension: Yes


Hx Heart Attack/AMI: Yes


Hx Congestive Heart Failure: Yes


Hx Diabetes: No


Hx Asthma: Yes


Hx COPD: No





- Surgical History


Past Surgical History?: Yes


Hx Coronary Stent: Yes





- Social History


Smoking Status: Current Every Day Smoker





- Medications


Home Medications: 


                                Home Medications











 Medication  Instructions  Recorded  Confirmed  Last Taken  Type


 


Aspirin EC [Halfprin EC] 81 mg PO QDAY #30 tablet. 10/16/19  Unknown Rx


 


Furosemide [Lasix TAB] 80 mg PO 0600,1800 #120 tablet 10/16/19  Unknown Rx


 


Metoprolol [Lopressor TAB] 25 mg PO BID #60 tablet 10/16/19  Unknown Rx


 


Rosuvastatin Calcium [Crestor] 40 mg PO DAILY #30 tablet 10/16/19  Unknown Rx


 


Spironolactone [Aldactone] 25 mg PO QDAY #30 tablet 10/16/19  Unknown Rx


 


lisinopriL [Zestril TAB] 5 mg PO QDAY #30 tablet 10/16/19  Unknown Rx














ED Physical Exam





- General


Limitations: No Limitations


General appearance: alert, in no apparent distress





- Head


Head exam: Present: atraumatic, normocephalic





- Eye


Eye exam: Present: normal appearance, EOMI





- ENT


ENT exam: Present: mucous membranes moist





- Neck


Neck exam: Present: normal inspection





- Respiratory


Respiratory exam: Present: normal lung sounds bilaterally.  Absent: respiratory 

distress





- Cardiovascular


Cardiovascular Exam: Present: regular rate, normal rhythm





- GI/Abdominal


GI/Abdominal exam: Present: soft.  Absent: distended, tenderness





- Extremities Exam


Extremities exam: Present: normal inspection.  Absent: pedal edema, calf 

tenderness





- Neurological Exam


Neurological exam: Present: alert, oriented X3





- Psychiatric


Psychiatric exam: Present: normal affect, normal mood





- Skin


Skin exam: Present: warm, dry, intact, normal color.  Absent: rash





ED Course


                                   Vital Signs











  01/27/21 01/27/21 01/27/21





  04:43 05:48 05:51


 


Temperature 97.4 F L 98.1 F 


 


Pulse Rate 82 73 


 


Respiratory 16 15 15





Rate   


 


Blood Pressure 148/93  


 


Blood Pressure  139/85 





[Left]   


 


O2 Sat by Pulse 96 99 





Oximetry   














  01/27/21





  10:03


 


Temperature 


 


Pulse Rate 73


 


Respiratory 16





Rate 


 


Blood Pressure 


 


Blood Pressure 133/77





[Left] 


 


O2 Sat by Pulse 99





Oximetry 














ED Medical Decision Making





- Lab Data


Result diagrams: 


                                 01/27/21 04:53





                                 01/27/21 04:53





- EKG Data


-: EKG Interpreted by Me


EKG shows normal: sinus rhythm, axis, intervals, QRS complexes


Rate: normal





- EKG Data


When compared to previous EKG there are: changes noted (new T wave inversions in

 lateral leads)


Interpretation: other (T wave inversions inferolateral leads)





- Radiology Data


Radiology results: report reviewed, image reviewed


Critical care attestation.: 


If time is entered above; I have spent that time in minutes in the direct care 

of this critically ill patient, excluding procedure time.








ED Disposition


Clinical Impression: 


 Dyspnea, Abdominal pain





Disposition: DC-01 TO HOME OR SELFCARE


Is pt being admited?: No


Condition: Stable


Instructions:  Shortness of Breath, Adult, Easy-to-Read, Abdominal Pain, Adult, 

Easy-to-Read


Referrals: 


PRIMARY CARE,MD [Primary Care Provider] - 3-5 Days


Time of Disposition: 10:25

## 2021-01-27 NOTE — CAT SCAN REPORT
CT ABDOMEN AND PELVIS WITH CONTRAST



INDICATION / CLINICAL INFORMATION:

Abdominal pain.



TECHNIQUE:

Axial CT images were obtained through the abdomen and pelvis following the administration of intraven
ous contrast.  All CT scans at this location are performed using CT dose reduction for ALARA by means
 of automated exposure control. 



COMPARISON:

None available.



FINDINGS:



LOWER CHEST: Bibasilar paraseptal emphysema and reticular changes.

LIVER: Diffusely hypoattenuating compatible with steatosis.

GALLBLADDER: No significant abnormality.  

PANCREAS: No significant abnormality.

SPLEEN: No significant abnormality.

ADRENALS: No significant abnormality.

KIDNEYS / URETERS: No significant abnormality.

URINARY BLADDER: No significant abnormality.

REPRODUCTIVE ORGANS: No significant abnormality.



STOMACH / SMALL BOWEL: No significant abnormality. 

COLON: No significant abnormality. 

APPENDIX: No significant abnormality.  

PERITONEUM: No free fluid. No free air. No fluid collection.

LYMPH NODES: No significant adenopathy.

AORTA / ARTERIES: Mild atherosclerotic calcification without acute abnormality. 

IVC / VEINS: No significant abnormality.



SKELETAL SYSTEM: No significant abnormality.



ADDITIONAL FINDINGS: None.



IMPRESSION:

1. No acute abdominopelvic abnormality.

2. Bibasilar paraseptal emphysema and reticular changes.

3. Hepatic steatosis.



Signer Name: Ivan Wall MD 

Signed: 1/27/2021 10:14 AM

Workstation Name: [id number]

## 2022-07-18 ENCOUNTER — HOSPITAL ENCOUNTER (EMERGENCY)
Dept: HOSPITAL 5 - ED | Age: 57
LOS: 1 days | Discharge: HOME | End: 2022-07-19
Payer: MEDICARE

## 2022-07-18 VITALS — DIASTOLIC BLOOD PRESSURE: 100 MMHG | SYSTOLIC BLOOD PRESSURE: 180 MMHG

## 2022-07-18 DIAGNOSIS — Z79.899: ICD-10-CM

## 2022-07-18 DIAGNOSIS — F17.200: ICD-10-CM

## 2022-07-18 DIAGNOSIS — I11.0: ICD-10-CM

## 2022-07-18 DIAGNOSIS — J45.909: ICD-10-CM

## 2022-07-18 DIAGNOSIS — R07.9: Primary | ICD-10-CM

## 2022-07-18 DIAGNOSIS — I21.9: ICD-10-CM

## 2022-07-18 DIAGNOSIS — I50.9: ICD-10-CM

## 2022-07-18 PROCEDURE — 85025 COMPLETE CBC W/AUTO DIFF WBC: CPT

## 2022-07-18 PROCEDURE — 84484 ASSAY OF TROPONIN QUANT: CPT

## 2022-07-18 PROCEDURE — 99284 EMERGENCY DEPT VISIT MOD MDM: CPT

## 2022-07-18 PROCEDURE — 36415 COLL VENOUS BLD VENIPUNCTURE: CPT

## 2022-07-18 PROCEDURE — 71046 X-RAY EXAM CHEST 2 VIEWS: CPT

## 2022-07-18 PROCEDURE — 80053 COMPREHEN METABOLIC PANEL: CPT

## 2022-07-18 PROCEDURE — 93005 ELECTROCARDIOGRAM TRACING: CPT

## 2022-07-19 LAB
ALBUMIN SERPL-MCNC: 4 G/DL (ref 3.9–5)
ALT SERPL-CCNC: 13 UNITS/L (ref 7–56)
BASOPHILS # (AUTO): 0.1 K/MM3 (ref 0–0.1)
BASOPHILS NFR BLD AUTO: 0.5 % (ref 0–1.8)
BUN SERPL-MCNC: 17 MG/DL (ref 9–20)
BUN/CREAT SERPL: 11 %
CALCIUM SERPL-MCNC: 9.6 MG/DL (ref 8.4–10.2)
EOSINOPHIL # BLD AUTO: 0.2 K/MM3 (ref 0–0.4)
EOSINOPHIL NFR BLD AUTO: 1.5 % (ref 0–4.3)
HCT VFR BLD CALC: 44.4 % (ref 35.5–45.6)
HEMOLYSIS INDEX: 4
HGB BLD-MCNC: 14.7 GM/DL (ref 11.8–15.2)
LYMPHOCYTES # BLD AUTO: 2.8 K/MM3 (ref 1.2–5.4)
LYMPHOCYTES NFR BLD AUTO: 23.8 % (ref 13.4–35)
MCHC RBC AUTO-ENTMCNC: 33 % (ref 32–34)
MCV RBC AUTO: 86 FL (ref 84–94)
MONOCYTES # (AUTO): 0.9 K/MM3 (ref 0–0.8)
MONOCYTES % (AUTO): 7.7 % (ref 0–7.3)
PLATELET # BLD: 241 K/MM3 (ref 140–440)
RBC # BLD AUTO: 5.19 M/MM3 (ref 3.65–5.03)

## 2022-07-19 NOTE — ELECTROCARDIOGRAPH REPORT
[facility]

                                       

Test Date:    2022               Test Time:    23:36:46

Pat Name:     IVELISSE CARRILLO              Department:   

Patient ID:   [id number]          Room:          

Gender:       M                        Technician:   MB

:          [id number]               Requested By: DANISHA YANG

Order Number: [id number]              Reading MD:   Yulia Odonnell

                                 Measurements

Intervals                              Axis          

Rate:         93                       P:            60

WY:           130                      QRS:          66

QRSD:         113                      T:            263

QT:           365                                    

QTc:          455                                    

                           Interpretive Statements

Sinus rhythm

Borderline inferior Q waves

Nonspecific T abnormalities, lateral leads

No previous ECG available for comparison

Electronically Signed On 2022 18:56:17 EDT by Yulia Odonnell

## 2022-07-19 NOTE — EMERGENCY DEPARTMENT REPORT
ED Chest Pain HPI





- General


Chief Complaint: Chest Pain


Stated Complaint: CHEST PAIN


Source: patient


Mode of arrival: Ambulatory


Limitations: No Limitations





- History of Present Illness


Initial Comments: 


Patient 57-year-old male with history of hypertension, diabetes, CHF, MI, stent 

placement who presents for chest pain lower sternal radiating to left arm x30 

minutes.  Patient states history of MI x2 stents x2 and CHF.  Patient does 

endorse cough that is productive clear.  Has been no nausea no vomiting no 

diaphoresis.  No shortness of breath.  Patient is followed by cardiology patient

has associated history.  Patient denies PND there is no bilateral lower 

extremity swelling patient states adherence with medications.  Patient is COVID 

vaccinated.





MD Complaint: chest pain





- Related Data


                                Home Medications











 Medication  Instructions  Recorded  Confirmed  Last Taken


 


Clopidogrel Bisulfate [Plavix] 75 mg PO DAILY 12/13/18 12/13/18 Unknown


 


Famotidine [Pepcid] 20 mg PO BID 12/13/18 12/13/18 Unknown


 


ISOSORBIDE MONOnitrate [Imdur ER] 60 mg PO BID 12/13/18 12/13/18 Unknown


 


Lopressor TAB 50 mg PO BID 12/13/18 12/13/18 Unknown


 


Nitrostat 0.4 mg SL Q5MIN PRN 12/13/18 12/13/18 Unknown


 


Zocor TAB 40 mg PO DAILY 12/13/18 12/13/18 Unknown








                                  Previous Rx's











 Medication  Instructions  Recorded  Last Taken  Type


 


Aspirin 325 mg PO QDAY #30 tablet 12/20/16 12/12/18 18:00 Rx





   81 


 


Aspirin EC [Halfprin EC] 81 mg PO QDAY #30 tablet. 10/16/19 Unknown Rx


 


Furosemide [Lasix TAB] 80 mg PO 0600,1800 #120 tablet 10/16/19 Unknown Rx


 


Metoprolol [Lopressor TAB] 25 mg PO BID #60 tablet 10/16/19 Unknown Rx


 


Rosuvastatin Calcium [Crestor] 40 mg PO DAILY #30 tablet 10/16/19 Unknown Rx


 


Spironolactone [Aldactone] 25 mg PO QDAY #30 tablet 10/16/19 Unknown Rx


 


lisinopriL [Zestril TAB] 5 mg PO QDAY #30 tablet 10/16/19 Unknown Rx


 


Azithromycin 500 mg PO DAILY 5 Days #5 tab 07/19/22 Unknown Rx











                                    Allergies











Allergy/AdvReac Type Severity Reaction Status Date / Time


 


No Known Allergies Allergy   Unverified 03/23/22 09:48














Heart Score





- HEART Score


History: Slightly suspicious


EKG: Normal


Age: 45-65


Risk factors: 1-2 risk factors


Troponin: < normal limit


HEART Score: 2





- EKG Read Time


Time EKG Completed: 23:36 (6)


EKG Read Time: 23:37





ED Review of Systems


ROS: 


Stated complaint: CHEST PAIN


Other details as noted in HPI





Constitutional: denies: chills, fever


Eyes: denies: eye pain, eye discharge, vision change


ENT: denies: ear pain, throat pain


Respiratory: cough.  denies: orthopnea, shortness of breath, stridor, wheezing


Cardiovascular: chest pain.  denies: palpitations, dyspnea on exertion, 

orthopnea, paroxysmal nocturnal dyspnea


Endocrine: no symptoms reported


Gastrointestinal: denies: abdominal pain, nausea, vomiting, diarrhea


Genitourinary: denies: urgency, dysuria


Musculoskeletal: denies: back pain, joint swelling, arthralgia


Skin: denies: rash, lesions


Neurological: denies: headache, weakness, paresthesias, vertigo


Psychiatric: as per HPI


Hematological/Lymphatic: denies: easy bleeding, easy bruising





ED Past Medical Hx





- Past Medical History


Hx Hypertension: Yes


Hx Heart Attack/AMI: Yes


Hx Congestive Heart Failure: Yes


Hx Diabetes: No


Hx Asthma: Yes


Hx COPD: No





- Surgical History


Hx Coronary Stent: Yes


Additional Surgical History: Stents





- Social History


Smoking Status: Current Every Day Smoker





- Medications


Home Medications: 


                                Home Medications











 Medication  Instructions  Recorded  Confirmed  Last Taken  Type


 


Aspirin 325 mg PO QDAY #30 tablet 12/20/16 12/13/18 12/12/18 18:00 Rx





    81 


 


Clopidogrel Bisulfate [Plavix] 75 mg PO DAILY 12/13/18 12/13/18 Unknown History


 


Famotidine [Pepcid] 20 mg PO BID 12/13/18 12/13/18 Unknown History


 


ISOSORBIDE MONOnitrate [Imdur ER] 60 mg PO BID 12/13/18 12/13/18 Unknown History


 


Lopressor TAB 50 mg PO BID 12/13/18 12/13/18 Unknown History


 


Nitrostat 0.4 mg SL Q5MIN PRN 12/13/18 12/13/18 Unknown History


 


Zocor TAB 40 mg PO DAILY 12/13/18 12/13/18 Unknown History


 


Aspirin EC [Halfprin EC] 81 mg PO QDAY #30 tablet.dr 10/16/19  Unknown Rx


 


Furosemide [Lasix TAB] 80 mg PO 0600,1800 #120 tablet 10/16/19  Unknown Rx


 


Metoprolol [Lopressor TAB] 25 mg PO BID #60 tablet 10/16/19  Unknown Rx


 


Rosuvastatin Calcium [Crestor] 40 mg PO DAILY #30 tablet 10/16/19  Unknown Rx


 


Spironolactone [Aldactone] 25 mg PO QDAY #30 tablet 10/16/19  Unknown Rx


 


lisinopriL [Zestril TAB] 5 mg PO QDAY #30 tablet 10/16/19  Unknown Rx


 


Azithromycin 500 mg PO DAILY 5 Days #5 tab 07/19/22  Unknown Rx














ED Physical Exam





- General


Limitations: No Limitations


General appearance: alert





- Head


Head exam: Present: normocephalic, normal inspection





- Eye


Eye exam: Present: PERRL, EOMI


Pupils: Present: normal accommodation





- ENT


ENT exam: Present: mucous membranes moist





- Neck


Neck exam: Present: normal inspection, full ROM.  Absent: tenderness, 

lymphadenopathy





- Respiratory


Respiratory exam: Present: normal lung sounds bilaterally, chest wall tenderness

(Left lateral anterior chest wall tenderness no crepitus no ecchymosis no step-

off lung sounds are clear.).  Absent: respiratory distress, wheezes, stridor





- Cardiovascular


Cardiovascular Exam: Present: regular rate, normal rhythm, normal heart sounds





- GI/Abdominal


GI/Abdominal exam: Present: soft, normal bowel sounds.  Absent: distended, 

tenderness, guarding, rebound, rigid, bruit, hernia





- Rectal


Rectal exam: Present: deferred





- Extremities Exam


Extremities exam: Present: normal inspection, full ROM, normal capillary refill.

 Absent: pedal edema





- Back Exam


Back exam: Present: normal inspection, full ROM.  Absent: CVA tenderness (R), 

CVA tenderness (L)





- Neurological Exam


Neurological exam: Present: alert, oriented X3, CN II-XII intact, normal gait





- Expanded Neurological Exam


  ** Expanded


Patient oriented to: Present: person, place, time


Speech: Present: fluid speech


Motor strength exam: RUE: 5, LUE: 5, RLE: 5, LLE: 5


Best Eye Response ([place]): (4) open spontaneously


Best Motor Response ([place]): (6) obeys commands


Best Verbal Response ([place]): (5) oriented


[place] Total: 15





- Psychiatric


Psychiatric exam: Present: normal affect, normal mood





- Skin


Skin exam: Present: warm, dry, intact, normal color.  Absent: rash





ED Course


                                   Vital Signs











  07/18/22





  23:31


 


Temperature 98.0 F


 


Pulse Rate 102 H


 


Respiratory 18





Rate 


 


Blood Pressure 180/100


 


O2 Sat by Pulse 97





Oximetry 














TIM score





- Tim Score


Age > 65: (0) No


Aspirin use within the Past 7 Days: (1) Yes


3 or more CAD Risk Factors: (1) Yes


2 or more Angina events in past 24 hrs: (0) No


Known CAD with more than 50% Stenosis: (0) No


Elevated Cardiac Markers: (0) No


ST Deviation Greater than 0.5mm: (0) No


TIM Score: 2





ED Medical Decision Making





- Lab Data


Result diagrams: 


                                 07/18/22 23:56





                                 07/18/22 23:56








Labs











  07/18/22 07/18/22





  23:56 23:56


 


WBC  11.9 H 


 


RBC  5.19 H 


 


Hgb  14.7 


 


Hct  44.4 


 


MCV  86 


 


MCH  28 


 


MCHC  33 


 


RDW  15.9 H 


 


Plt Count  241 


 


Lymph % (Auto)  23.8 


 


Mono % (Auto)  7.7 H 


 


Eos % (Auto)  1.5 


 


Baso % (Auto)  0.5 


 


Lymph # (Auto)  2.8 


 


Mono # (Auto)  0.9 H 


 


Eos # (Auto)  0.2 


 


Baso # (Auto)  0.1 


 


Seg Neutrophils %  66.5 


 


Seg Neutrophils #  7.9 H 


 


Sodium   141


 


Potassium   4.1


 


Chloride   104.5


 


Carbon Dioxide   24


 


Anion Gap   17


 


BUN   17


 


Creatinine   1.5 H


 


Estimated GFR   58


 


BUN/Creatinine Ratio   11


 


Glucose   114 H


 


Calcium   9.6


 


Total Bilirubin   0.20


 


AST   20


 


ALT   13


 


Alkaline Phosphatase   74


 


Troponin T   < 0.010


 


Total Protein   7.2


 


Albumin   4.0


 


Albumin/Globulin Ratio   1.3











Labs











  07/18/22 07/18/22 07/19/22





  23:56 23:56 03:05


 


WBC  11.9 H  


 


RBC  5.19 H  


 


Hgb  14.7  


 


Hct  44.4  


 


MCV  86  


 


MCH  28  


 


MCHC  33  


 


RDW  15.9 H  


 


Plt Count  241  


 


Lymph % (Auto)  23.8  


 


Mono % (Auto)  7.7 H  


 


Eos % (Auto)  1.5  


 


Baso % (Auto)  0.5  


 


Lymph # (Auto)  2.8  


 


Mono # (Auto)  0.9 H  


 


Eos # (Auto)  0.2  


 


Baso # (Auto)  0.1  


 


Seg Neutrophils %  66.5  


 


Seg Neutrophils #  7.9 H  


 


Sodium   141 


 


Potassium   4.1 


 


Chloride   104.5 


 


Carbon Dioxide   24 


 


Anion Gap   17 


 


BUN   17 


 


Creatinine   1.5 H 


 


Estimated GFR   58 


 


BUN/Creatinine Ratio   11 


 


Glucose   114 H 


 


Calcium   9.6 


 


Total Bilirubin   0.20 


 


AST   20 


 


ALT   13 


 


Alkaline Phosphatase   74 


 


Troponin T   < 0.010  0.012


 


Total Protein   7.2 


 


Albumin   4.0 


 


Albumin/Globulin Ratio   1.3 














- EKG Data


EKG shows normal: sinus rhythm, axis, intervals, QRS complexes


Rate: normal





- Radiology Data


Radiology results: report reviewed, image reviewed


CHEST 2 VIEWS   


 


 INDICATION / CLINICAL INFORMATION: CHEST PAIN.   


 


 COMPARISON: Chest x-ray 1/27/2021  


 


 FINDINGS:  


 


 SUPPORT DEVICES: None.  


 HEART / MEDIASTINUM: Heart size and mediastinal contour appear within normal 

limits.   


 LUNGS / PLEURA: Nonspecific minimal perihilar interstitial stranding with 

slightly more focal 


opacities in the left lower lung. No pneumothorax.   


 BONES: No significant osseous abnormality.  


 ADDITIONAL FINDINGS: No significant additional findings.  


 


 IMPRESSION:  


 1. Overall appearance of the chest suggests minimal change comparison. Similar 

appearance of 


opacities within the lower left lung may reflect sequelae of infection, 

scarring, or atelectasis.  


 


 Signer Name: Ivelisse Jordan II, MD   


 Signed: 7/19/2022 12:23 AM  


 Workstation Name: [id number]   


 


 


Transcribed By: NESTOR  


Dictated By: IVELISSE JORDAN II, MD  


Electronically Authenticated By: IVELISSE JORDAN II, MD    


Signed Date/Time: 07/19/22 0023                                


 


 


 


DD/DT: 07/19/22 0022                                                            

 


TD/TT:











- Medical Decision Making


Heart score is 2, TIM score is 2, bilateral small opacities, EKG sinus rhythm 

old anterior infarct, no ST elevated MI interpreted by ED attending.  Pain is 

improved with medications given in ED.  Plan cover for possible CAP, DC to home,

take medications as prescribed, continue all medications as prescribed, follow-

up with your primary care doctor in 2 to 3 days.





0555 patient now eloped prior to discharge.


Critical care attestation.: 


If time is entered above; I have spent that time in minutes in the direct care 

of this critically ill patient, excluding procedure time.








ED Disposition


Clinical Impression: 


Chest pain


Qualifiers:


 Chest pain type: unspecified Qualified Code(s): R07.9 - Chest pain, unspecified





Disposition: 01 HOME / SELF CARE / HOMELESS


Is pt being admited?: No


Does the pt Need Aspirin: No


Condition: Stable


Instructions:  Nonspecific Chest Pain, Adult


Additional Instructions: 


Take medications as prescribed, follow-up with your primary care doctor in 2 to 

3 days.  Return to emergency department should symptoms worsen.


Prescriptions: 


Azithromycin 500 mg PO DAILY 5 Days #5 tab


Referrals: 


[facility] [Other] - 3-5 Days


ASHLEIGH DOE MD [Staff Physician] - 3-5 Days


NATE LAINEZ MD [Staff Physician] - 3-5 Days


Forms:  Work/School Release Form(ED)


Time of Disposition: 05:57

## 2022-07-19 NOTE — XRAY REPORT
CHEST 2 VIEWS 



INDICATION / CLINICAL INFORMATION: CHEST PAIN. 



COMPARISON: Chest x-ray 1/27/2021



FINDINGS:



SUPPORT DEVICES: None.

HEART / MEDIASTINUM: Heart size and mediastinal contour appear within normal limits. 

LUNGS / PLEURA: Nonspecific minimal perihilar interstitial stranding with slightly more focal opaciti
es in the left lower lung. No pneumothorax. 

BONES: No significant osseous abnormality.

ADDITIONAL FINDINGS: No significant additional findings.



IMPRESSION:

1. Overall appearance of the chest suggests minimal change comparison. Similar appearance of opacitie
s within the lower left lung may reflect sequelae of infection, scarring, or atelectasis.



Signer Name: Vipul Jordan II, MD 

Signed: 7/19/2022 12:23 AM

Workstation Name: [id number]